# Patient Record
Sex: FEMALE | Race: WHITE | Employment: STUDENT | URBAN - METROPOLITAN AREA
[De-identification: names, ages, dates, MRNs, and addresses within clinical notes are randomized per-mention and may not be internally consistent; named-entity substitution may affect disease eponyms.]

---
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
## 2020-01-24 ENCOUNTER — OFFICE VISIT (OUTPATIENT)
Dept: FAMILY MEDICINE CLINIC | Age: 20
End: 2020-01-24
Payer: COMMERCIAL

## 2020-01-24 VITALS
HEIGHT: 61 IN | WEIGHT: 160.2 LBS | DIASTOLIC BLOOD PRESSURE: 80 MMHG | SYSTOLIC BLOOD PRESSURE: 118 MMHG | HEART RATE: 88 BPM | OXYGEN SATURATION: 98 % | BODY MASS INDEX: 30.25 KG/M2

## 2020-01-24 PROCEDURE — 99213 OFFICE O/P EST LOW 20 MIN: CPT | Performed by: PHYSICIAN ASSISTANT

## 2020-01-24 RX ORDER — SUMATRIPTAN 50 MG/1
TABLET, FILM COATED ORAL
Qty: 30 TABLET | Refills: 1 | Status: SHIPPED | OUTPATIENT
Start: 2020-01-24 | End: 2021-08-13

## 2020-01-24 RX ORDER — TOPIRAMATE 25 MG/1
TABLET ORAL
Qty: 90 TABLET | Refills: 2 | Status: SHIPPED | OUTPATIENT
Start: 2020-01-24 | End: 2020-05-11 | Stop reason: SDUPTHER

## 2020-01-24 RX ORDER — SUMATRIPTAN 25 MG/1
25 TABLET, FILM COATED ORAL
Qty: 9 TABLET | Refills: 1 | Status: CANCELLED | OUTPATIENT
Start: 2020-01-24 | End: 2020-01-24

## 2020-01-24 ASSESSMENT — PATIENT HEALTH QUESTIONNAIRE - PHQ9
SUM OF ALL RESPONSES TO PHQ QUESTIONS 1-9: 0
SUM OF ALL RESPONSES TO PHQ9 QUESTIONS 1 & 2: 0
2. FEELING DOWN, DEPRESSED OR HOPELESS: 0
SUM OF ALL RESPONSES TO PHQ QUESTIONS 1-9: 0
1. LITTLE INTEREST OR PLEASURE IN DOING THINGS: 0

## 2020-01-24 NOTE — PROGRESS NOTES
medications for this visit. ALLERGIES  No Known Allergies    PHYSICAL EXAM    /80   Pulse 88   Ht 5' 1\" (1.549 m)   Wt 160 lb 3.2 oz (72.7 kg)   SpO2 98%   BMI 30.27 kg/m²     Constitutional:  Well developed, well nourished  HENT:  Normocephalic, atraumatic  Eyes:  conjunctiva normal, no discharge, no scleral icterus  Neck:  No tenderness, supple, no thyromegaly  Lymphatic:  No lymphadenopathy noted  Cardiovascular:  Normal heart rate, normal rhythm, no murmurs, gallops or rubs  Thorax & Lungs:  Normal breath sounds, no respiratory distress, no wheezing  Skin:  Warm, dry, no erythema, no rash  Neurologic:  Alert & oriented X 3, normal motor function, normal sensory function, no focal deficits noted  Psychiatric:  Affect normal, mood normal    ASSESSMENT & PLAN    Vandana Zechariah was seen today for other. Diagnoses and all orders for this visit:    Other migraine without status migrainosus, not intractable  -     topiramate (TOPAMAX) 25 MG tablet; Take one tablet po every morning, take two tablets po every evening  -     SUMAtriptan (IMITREX) 50 MG tablet; Take 1-2 tablets po at onset of migraine. May repeat two hours later if needed. Max dose:  200 mg in 24 hours. Migraines have improved in frequency, but there is still room for improvement. Patient is currently taking Topamax 50 mg every evening. We will increase Topamax. Patient to take 25 mg every morning and 50 mg every evening. We will also increase her Imitrex as that is not working either. Imitrex 50 mg tablets. Patient will take 1 to 2 tablets at onset of migraine. She may repeat this dose 2 hours later if headache is still present. She understands that her max dose in a 24-hour period is 200 mg. She was encouraged to keep a migraine log. We will see her back in 3 months, but sooner if needed.     Medications Discontinued During This Encounter   Medication Reason    SUMAtriptan (IMITREX) 25 MG tablet DOSE ADJUSTMENT    topiramate

## 2020-05-11 ENCOUNTER — VIRTUAL VISIT (OUTPATIENT)
Dept: FAMILY MEDICINE CLINIC | Age: 20
End: 2020-05-11
Payer: COMMERCIAL

## 2020-05-11 VITALS — BODY MASS INDEX: 28.32 KG/M2 | WEIGHT: 150 LBS | HEIGHT: 61 IN

## 2020-05-11 PROCEDURE — 99213 OFFICE O/P EST LOW 20 MIN: CPT | Performed by: FAMILY MEDICINE

## 2020-05-11 RX ORDER — TOPIRAMATE 25 MG/1
TABLET ORAL
Qty: 270 TABLET | Refills: 1 | Status: SHIPPED | OUTPATIENT
Start: 2020-05-11 | End: 2021-08-13

## 2020-05-11 ASSESSMENT — ENCOUNTER SYMPTOMS
DIARRHEA: 0
NAUSEA: 0
CHEST TIGHTNESS: 0
VOMITING: 0
TROUBLE SWALLOWING: 0
BLOOD IN STOOL: 0
WHEEZING: 0
ABDOMINAL PAIN: 0
EYE PAIN: 0
SHORTNESS OF BREATH: 0

## 2020-05-11 NOTE — PROGRESS NOTES
2020    TELEHEALTH EVALUATION -- Audio/Visual (During ECNGT-99 public health emergency)    HPI:    Grecia Bustos (:  2000) has requested an audio/video evaluation for the following concern(s):    Follow-up on migraine headaches. Patient states the headaches are actually doing fairly well occasionally wakes up with a headache that goes away rather quickly saw her dentist feels that she does have a TMJ and was given some bite plate that seem to have helped. Migraines otherwise are under better control with less severe with the Topamax and the Imitrex has been helpful. 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 Review of Systems   Constitutional: Negative for activity change and fatigue. HENT: Negative for congestion, hearing loss, mouth sores and trouble swallowing. Eyes: Negative for pain and visual disturbance. Respiratory: Negative for chest tightness, shortness of breath and wheezing. Cardiovascular: Negative for chest pain and palpitations. Gastrointestinal: Negative for abdominal pain, blood in stool, diarrhea, nausea and vomiting. Genitourinary: Negative for dysuria, frequency and urgency. Musculoskeletal: Negative for arthralgias, gait problem and neck stiffness. Skin: Negative for rash. Allergic/Immunologic: Negative for environmental allergies. Neurological: Positive for headaches. Negative for dizziness, seizures, speech difficulty, weakness and light-headedness. Hematological: Does not bruise/bleed easily. Psychiatric/Behavioral: Negative for agitation, confusion and hallucinations. Prior to Visit Medications    Medication Sig Taking? Authorizing Provider   topiramate (TOPAMAX) 25 MG tablet Take one tablet po every morning, take two tablets po every evening Yes Emanuel Guillen MD   SUMAtriptan (IMITREX) 50 MG tablet Take 1-2 tablets po at onset of migraine. May repeat two hours later if needed.   Max dose:  200 mg in 24 hours. Yes Debbie Oviedo PA-C   triamcinolone (KENALOG) 0.1 % cream Apply topically 2 times daily. Yes JEFERSON Paulson   ibuprofen (ADVIL;MOTRIN) 200 MG tablet Take 200 mg by mouth every 6 hours as needed for Pain. Historical Provider, MD       Social History     Tobacco Use    Smoking status: Never Smoker    Smokeless tobacco: Never Used   Substance Use Topics    Alcohol use: No    Drug use: No            PHYSICAL EXAMINATION:  [ INSTRUCTIONS:  \"[x]\" Indicates a positive item  \"[]\" Indicates a negative item  -- DELETE ALL ITEMS NOT EXAMINED]  Vital Signs: (As obtained by patient/caregiver or practitioner observation)    Blood pressure-  Heart rate-    Respiratory rate-    Temperature-  Pulse oximetry-     Constitutional: [x] Appears well-developed and well-nourished [x] No apparent distress      [] Abnormal-   Mental status  [x] Alert and awake  [x] Oriented to person/place/time [x]Able to follow commands      Eyes:  EOM    []  Normal  [] Abnormal-  Sclera  [x]  Normal  [] Abnormal -         Discharge []  None visible  [] Abnormal -    HENT:   [x] Normocephalic, atraumatic.   [] Abnormal   [x] Mouth/Throat: Mucous membranes are moist.     External Ears [] Normal  [] Abnormal-     Neck: [x] No visualized mass     Pulmonary/Chest: [x] Respiratory effort normal.  [x] No visualized signs of difficulty breathing or respiratory distress        [] Abnormal-      Musculoskeletal:   [] Normal gait with no signs of ataxia         [x] Normal range of motion of neck        [] Abnormal-       Neurological:        [x] No Facial Asymmetry (Cranial nerve 7 motor function) (limited exam to video visit)          [x] No gaze palsy        [] Abnormal-         Skin:        [x] No significant exanthematous lesions or discoloration noted on facial skin         [] Abnormal-            Psychiatric:       [x] Normal Affect [] No Hallucinations        [] Abnormal-     Other pertinent observable physical exam findings- ASSESSMENT/PLAN:  1. Other migraine without status migrainosus, not intractable  At this point- refills provided. Patient encouraged for continued healthy lifestyle with rest witgh plenty of fluids. She was reminded that she should not take Topamax if there is any chance that she is pregnant. - topiramate (TOPAMAX) 25 MG tablet; Take one tablet po every morning, take two tablets po every evening  Dispense: 270 tablet; Refill: 1      Return in about 4 months (around 9/11/2020). Lefty Hernandez is a 21 y.o. female being evaluated by a Virtual Visit (video visit) encounter to address concerns as mentioned above. A caregiver was present when appropriate. Due to this being a TeleHealth encounter (During [place] [address] public health emergency), evaluation of the following organ systems was limited: Vitals/Constitutional/EENT/Resp/CV/GI//MS/Neuro/Skin/Heme-Lymph-Imm. Pursuant to the emergency declaration under the [address] [place] [address] authority and the [employer] Act, this Virtual Visit was conducted with patient's (and/or legal guardian's) consent, to reduce the patient's risk of exposure to COVID-19 and provide necessary medical care. The patient (and/or legal guardian) has also been advised to contact this office for worsening conditions or problems, and seek emergency medical treatment and/or call 911 if deemed necessary. Patient identification was verified at the start of the visit: Yes    Total time spent on this encounter: 15 minutes    Services see her back as directed through a video synchronous discussion virtually to substitute for in-person clinic visit. Patient and provider were located at their individual homes. --Ambrocio Yancey MD on 5/11/2020 at 5:07 PM    An electronic signature was used to authenticate this note.

## 2020-12-16 ENCOUNTER — OFFICE VISIT (OUTPATIENT)
Dept: FAMILY MEDICINE CLINIC | Age: 20
End: 2020-12-16
Payer: COMMERCIAL

## 2020-12-16 VITALS
HEART RATE: 120 BPM | SYSTOLIC BLOOD PRESSURE: 120 MMHG | TEMPERATURE: 96.9 F | OXYGEN SATURATION: 100 % | HEIGHT: 61 IN | BODY MASS INDEX: 28.36 KG/M2 | DIASTOLIC BLOOD PRESSURE: 78 MMHG | WEIGHT: 150.2 LBS

## 2020-12-16 DIAGNOSIS — R00.0 TACHYCARDIA: ICD-10-CM

## 2020-12-16 DIAGNOSIS — R00.2 PALPITATIONS: ICD-10-CM

## 2020-12-16 DIAGNOSIS — Z11.4 SCREENING FOR HUMAN IMMUNODEFICIENCY VIRUS: ICD-10-CM

## 2020-12-16 LAB
A/G RATIO: 1.9 (ref 1.1–2.2)
ALBUMIN SERPL-MCNC: 5 G/DL (ref 3.4–5)
ALP BLD-CCNC: 46 U/L (ref 40–129)
ALT SERPL-CCNC: 16 U/L (ref 10–40)
ANION GAP SERPL CALCULATED.3IONS-SCNC: 15 MMOL/L (ref 3–16)
AST SERPL-CCNC: 18 U/L (ref 15–37)
BASOPHILS ABSOLUTE: 0 K/UL (ref 0–0.2)
BASOPHILS RELATIVE PERCENT: 0.7 %
BILIRUB SERPL-MCNC: 0.9 MG/DL (ref 0–1)
BUN BLDV-MCNC: 14 MG/DL (ref 7–20)
CALCIUM SERPL-MCNC: 9.8 MG/DL (ref 8.3–10.6)
CHLORIDE BLD-SCNC: 106 MMOL/L (ref 99–110)
CO2: 18 MMOL/L (ref 21–32)
CREAT SERPL-MCNC: 0.8 MG/DL (ref 0.6–1.1)
EOSINOPHILS ABSOLUTE: 0.1 K/UL (ref 0–0.6)
EOSINOPHILS RELATIVE PERCENT: 2 %
GFR AFRICAN AMERICAN: >60
GFR NON-AFRICAN AMERICAN: >60
GLOBULIN: 2.6 G/DL
GLUCOSE BLD-MCNC: 96 MG/DL (ref 70–99)
HCT VFR BLD CALC: 40.7 % (ref 36–48)
HEMOGLOBIN: 13.5 G/DL (ref 12–16)
LYMPHOCYTES ABSOLUTE: 1.6 K/UL (ref 1–5.1)
LYMPHOCYTES RELATIVE PERCENT: 29.5 %
MCH RBC QN AUTO: 28.7 PG (ref 26–34)
MCHC RBC AUTO-ENTMCNC: 33.2 G/DL (ref 31–36)
MCV RBC AUTO: 86.5 FL (ref 80–100)
MONOCYTES ABSOLUTE: 0.4 K/UL (ref 0–1.3)
MONOCYTES RELATIVE PERCENT: 7.1 %
NEUTROPHILS ABSOLUTE: 3.3 K/UL (ref 1.7–7.7)
NEUTROPHILS RELATIVE PERCENT: 60.7 %
PDW BLD-RTO: 12.8 % (ref 12.4–15.4)
PLATELET # BLD: 312 K/UL (ref 135–450)
PMV BLD AUTO: 9.5 FL (ref 5–10.5)
POTASSIUM SERPL-SCNC: 4.2 MMOL/L (ref 3.5–5.1)
RBC # BLD: 4.71 M/UL (ref 4–5.2)
SODIUM BLD-SCNC: 139 MMOL/L (ref 136–145)
T4 FREE: 1.3 NG/DL (ref 0.9–1.8)
TOTAL PROTEIN: 7.6 G/DL (ref 6.4–8.2)
TSH SERPL DL<=0.05 MIU/L-ACNC: 1.51 UIU/ML (ref 0.27–4.2)
WBC # BLD: 5.5 K/UL (ref 4–11)

## 2020-12-16 PROCEDURE — 99214 OFFICE O/P EST MOD 30 MIN: CPT | Performed by: PHYSICIAN ASSISTANT

## 2020-12-16 ASSESSMENT — ENCOUNTER SYMPTOMS
COUGH: 0
SHORTNESS OF BREATH: 0

## 2020-12-16 NOTE — PROGRESS NOTES
12/16/2020    [address]    Chief Complaint   Patient presents with    Palpitations     pt states heart rate increases up to 138 , has had exposure to covid but tested neg    Anxiety     pt states she has palp when anxious     Migraine     2-3 past 2 wks, pt states not always compliant with topamax but has been recently        HPI  History obtained from the patient. Ilia Melton is a 21 y.o. female who presents today for palpitations and fast heart rate X few weeks. The patient states that she obtained a pulse ox monitor and has been checking her pulse randomly at home. She states that it runs 135-160. If she sits down or lays down for a while, she can get it down to 100. Denies chest pain, SOB, lightheadness, cough. She notes that she does frequently feel anxious, but denies symptoms such as panic attacks, sweating, etc.     The patient states that she has trouble remembering to take her Topamax. She states that when she does remember to take her medicine regularly, she only gets migraines once a month, but if she's off of it for a while, she gets migraines once or twice weekly. Notes that she rarely takes imitrex because she does not find it helpful. REVIEW OF SYMPTOMS  Review of Systems   Constitutional: Negative for chills and fever. Respiratory: Negative for cough and shortness of breath. Cardiovascular: Positive for palpitations. Negative for chest pain. PAST MEDICAL HISTORY  No past medical history on file.     FAMILY HISTORY  Family History   Problem Relation Age of Onset    Cancer Other         Grandmother/Grandfather    Asthma Paternal Grandfather     Migraines Mother     Kidney Disease Other         Grandfather    Hypertension Other     Asthma Father        SOCIAL HISTORY  Social History     Socioeconomic History    Marital status: Single     Spouse name: Not on file    Number of children: Not on file    Years of education: Not on file    Highest education level: Not on file Occupational History    Not on file   Social Needs    Financial resource strain: Not on file    Food insecurity     Worry: Not on file     Inability: Not on file    Transportation needs     Medical: Not on file     Non-medical: Not on file   Tobacco Use    Smoking status: Never Smoker    Smokeless tobacco: Never Used   Substance and Sexual Activity    Alcohol use: No    Drug use: No    Sexual activity: Never   Lifestyle    Physical activity     Days per week: Not on file     Minutes per session: Not on file    Stress: Not on file   Relationships    Social connections     Talks on phone: Not on file     Gets together: Not on file     Attends Christian service: Not on file     Active member of club or organization: Not on file     Attends meetings of clubs or organizations: Not on file     Relationship status: Not on file    Intimate partner violence     Fear of current or ex partner: Not on file     Emotionally abused: Not on file     Physically abused: Not on file     Forced sexual activity: Not on file   Other Topics Concern    Not on file   Social History Narrative    Not on file        SURGICAL HISTORY  Past Surgical History:   Procedure Laterality Date    TONSILLECTOMY         CURRENT MEDICATIONS  Current Outpatient Medications   Medication Sig Dispense Refill    topiramate (TOPAMAX) 25 MG tablet Take one tablet po every morning, take two tablets po every evening 270 tablet 1    SUMAtriptan (IMITREX) 50 MG tablet Take 1-2 tablets po at onset of migraine. May repeat two hours later if needed. Max dose:  200 mg in 24 hours. 30 tablet 1    triamcinolone (KENALOG) 0.1 % cream Apply topically 2 times daily. (Patient taking differently: Apply topically 2 times daily. prn) 30 g 1    ibuprofen (ADVIL;MOTRIN) 200 MG tablet Take 200 mg by mouth every 6 hours as needed for Pain. No current facility-administered medications for this visit.         ALLERGIES  No Known Allergies    RECENT LABS No results found for: LABA1C  No results found for: EAG    No results found for: CHOL  No results found for: LDLCHOLESTEROL, LDLCALC    No results found for: WBC, HGB, HCT, MCV, PLT    PHYSICAL EXAM  /78   Pulse 120   Temp 96.9 °F (36.1 °C)   Ht 5' 1\" (1.549 m)   Wt 150 lb 3.2 oz (68.1 kg)   SpO2 100%   BMI 28.38 kg/m²     Physical Exam  Constitutional:       Appearance: Normal appearance. HENT:      Head: Normocephalic and atraumatic. Eyes:      Comments: EOM grossly intact. Cardiovascular:      Rate and Rhythm: Regular rhythm. Tachycardia present. Heart sounds: No murmur. No friction rub. No gallop. Pulmonary:      Effort: Pulmonary effort is normal.      Breath sounds: Normal breath sounds. No wheezing, rhonchi or rales. Skin:     General: Skin is warm and dry. Neurological:      Mental Status: She is alert and oriented to person, place, and time. Comments: Cranial nerves II-XII grossly intact   Psychiatric:         Mood and Affect: Mood normal.         Behavior: Behavior normal.         ASSESSMENT & PLAN  1. Palpitations  Will check labs and Holter Monitor and advise based on findings. If these come back normal, we may consider switching from Topamax to Propranolol for migraine prophylaxis, as this may help with migraine prevention AND tachycardia. - CBC Auto Differential; Future  - Comprehensive Metabolic Panel; Future  - T4, Free; Future  - TSH without Reflex; Future  - Holter Monitor 48 Hour; Future    2. Tachycardia  See above (#1).   - CBC Auto Differential; Future  - Comprehensive Metabolic Panel; Future  - T4, Free; Future  - TSH without Reflex; Future  - Holter Monitor 48 Hour; Future    3. Migraine without status migrainosus, not intractable, unspecified migraine type  See above (#1). 4. Screening for human immunodeficiency virus  [place] maintenance requirement.   - HIV Screen; Future          Return in about 3 weeks (around 1/6/2021).

## 2020-12-16 NOTE — PATIENT INSTRUCTIONS
Patient Education        Palpitations: Care Instructions  Your Care Instructions     Heart palpitations are the uncomfortable sensation that your heart is beating fast or irregularly. You might feel pounding or fluttering in your chest. It might feel like your heart is skipping a beat. Although palpitations may be caused by a heart problem, they also occur because of stress, fatigue, or use of alcohol, caffeine, or nicotine. Many medicines, including diet pills, antihistamines, decongestants, and some herbal products, can cause heart palpitations. Nearly everyone has palpitations from time to time. Depending on your symptoms, your doctor may need to do more tests to try to find the cause of your palpitations. Follow-up care is a key part of your treatment and safety. Be sure to make and go to all appointments, and call your doctor if you are having problems. It's also a good idea to know your test results and keep a list of the medicines you take. How can you care for yourself at home? · Avoid caffeine, nicotine, and excess alcohol. · Do not take illegal drugs, such as methamphetamines and cocaine. · Do not take weight loss or diet medicines unless you talk with your doctor first.  · Get plenty of sleep. · Do not overeat. · If you have palpitations again, take deep breaths and try to relax. This may slow a racing heart. · If you start to feel lightheaded, lie down to avoid injuries that might result if you pass out and fall down. · Keep a record of your palpitations and bring it to your next doctor's appointment. Write down:  ? The date and time. ? Your pulse. (If your heart is beating fast, it may be hard to count your pulse.)  ? What you were doing when the palpitations started. ? How long the palpitations lasted. ? Any other symptoms. · If an activity causes palpitations, slow down or stop. Talk to your doctor before you do that activity again. Care instructions adapted under license by [facility] ([facility]). If you have questions about a medical condition or this instruction, always ask your healthcare professional. [address] any warranty or liability for your use of this information. Patient Education         Relaxation Exercise: Deep Breathing (01:49)  Your health professional recommends that you watch this short online health video. Learn a deep-breathing exercise to reduce your stress. How to watch the video    Scan the QR code   OR Visit the website    [email or web address]://[email or web address]   Current as of: December 16, 2019               Content Version: 12.6  © 0058-0991 [employer]. Care instructions adapted under license by [employer] [facility] ([facility]). If you have questions about a medical condition or this instruction, always ask your healthcare professional. [address] any warranty or liability for your use of this information.

## 2020-12-17 LAB
HIV AG/AB: NORMAL
HIV ANTIGEN: NORMAL
HIV-1 ANTIBODY: NORMAL
HIV-2 AB: NORMAL

## 2020-12-22 ENCOUNTER — HOSPITAL ENCOUNTER (OUTPATIENT)
Dept: NON INVASIVE DIAGNOSTICS | Age: 20
Discharge: HOME | End: 2020-12-22
Payer: COMMERCIAL

## 2020-12-22 PROCEDURE — 93226 XTRNL ECG REC<48 HR SCAN A/R: CPT

## 2020-12-22 PROCEDURE — 93225 XTRNL ECG REC<48 HRS REC: CPT

## 2020-12-30 NOTE — PROCEDURES
[address], [address] [place] [address]                                 HOLTER MONITOR    PATIENT NAME: Vern Holman                    :        2000  MED REC NO:   [id number]                          ROOM:  ACCOUNT NO:   [de-identified]                           ADMIT DATE: 2020  PROVIDER:     Linh Mcginnis MD    HOLTER MONITOR 24-HOURS    DATE OF STUDY:  2020    INDICATION:  Tachycardia. FINDINGS:  This is a 24-hour Holter monitor. Minimum heart rate is 51  beats per minute at 8:34 a.m. Average heart rate is 91 beats per  minute. Maximum heart rate is 180 beats per minute. The longest R-R  interval is 1.34 seconds. The patient's entire rhythm is sinus rhythm. The patient had an episode of tachycardia. SVT cannot be excluded. The  rate of 180 present. This was at 9:05 a.m. and the patient woke up at  that time. The patient had isolated PVC also present. The patient had  another episode of tachycardia, rate of 172 beats per minute present. This was on 2020 at 8:52 a.m. The patient did not report symptoms  with that. She has some PVCs also present. Isolated one PVC was noted. IMPRESSION:  1. Sinus rhythm. 2.  Isolated PVC. 3.  Sinus tachycardia. 4.  Short runs of SVT present. The maximum rate of 180 beats per minute  present. Clinical correlation is recommended.         Nohelia Welch MD    D: 2020 18:30:51       T: 2020 22:27:37     [id number]  Job#: [id number]     Doc#: [id number]

## 2020-12-31 NOTE — RESULT ENCOUNTER NOTE
Please call the patient and let them know that I reviewed her Holter monitor results. It shows mostly normal sinus rhythm, but there were two episodes of tachycardia (fast heart rate). I'd like to have a cardiologist take a look at these findings and evaluate her, so I've placed a referral to [facility]. She should receive a call to schedule an appointment within the next week or so. Please make sure she has their info:    [facility]   [address] [facility], [address] [facility],Third [address]  [phone]    Thanks,  Carri Turcios

## 2021-01-05 ENCOUNTER — TELEPHONE (OUTPATIENT)
Dept: CARDIOLOGY CLINIC | Age: 21
End: 2021-01-05

## 2021-01-05 NOTE — TELEPHONE ENCOUNTER
Tried calling patient to schedule a consult for palpitations and abnormal holter. Referral from  [facility]. There was no answer, left message asking patient to return call to office to schedule the appointment.

## 2021-01-11 ENCOUNTER — TELEPHONE (OUTPATIENT)
Dept: CARDIOLOGY CLINIC | Age: 21
End: 2021-01-11

## 2021-08-13 ENCOUNTER — INITIAL CONSULT (OUTPATIENT)
Dept: CARDIOLOGY CLINIC | Age: 21
End: 2021-08-13
Payer: COMMERCIAL

## 2021-08-13 VITALS
HEART RATE: 65 BPM | DIASTOLIC BLOOD PRESSURE: 70 MMHG | OXYGEN SATURATION: 98 % | SYSTOLIC BLOOD PRESSURE: 100 MMHG | RESPIRATION RATE: 14 BRPM | HEIGHT: 61 IN | BODY MASS INDEX: 30.02 KG/M2 | WEIGHT: 159 LBS

## 2021-08-13 DIAGNOSIS — R00.2 PALPITATIONS: Primary | ICD-10-CM

## 2021-08-13 PROCEDURE — 99243 OFF/OP CNSLTJ NEW/EST LOW 30: CPT | Performed by: INTERNAL MEDICINE

## 2021-08-13 PROCEDURE — 93000 ELECTROCARDIOGRAM COMPLETE: CPT | Performed by: INTERNAL MEDICINE

## 2021-08-13 RX ORDER — PROPRANOLOL HYDROCHLORIDE 80 MG/1
80 CAPSULE, EXTENDED RELEASE ORAL DAILY
COMMUNITY
Start: 2021-08-07 | End: 2021-08-13 | Stop reason: ALTCHOICE

## 2021-08-13 RX ORDER — PROPRANOLOL HYDROCHLORIDE 20 MG/1
20 TABLET ORAL 3 TIMES DAILY
Qty: 90 TABLET | Refills: 0 | Status: SHIPPED | OUTPATIENT
Start: 2021-08-13 | End: 2021-09-30

## 2021-08-13 RX ORDER — IVABRADINE 5 MG/1
5 TABLET, FILM COATED ORAL 2 TIMES DAILY WITH MEALS
COMMUNITY
Start: 2021-08-12

## 2021-08-13 NOTE — PROGRESS NOTES
Electrophysiology Consult Note      Reason for consultation:  Tachycardia    Chief complaint : Shortness of breath with exertion    Referring physician:       Primary care physician: Theresa Adames MD      History of Present Illness:     Patient is a 44-year-old female with no significant medical history referred by Dr. Sarah Otero for tachycardia. Patient reports that she used to weigh a lot less before and used to be very athletic now she is having shortness of breath with exertion and also having heart rates very high into 170s. Patient also reports her chest hurts with exertion. Patient is on medications and still heart rate is high. Patient denies any palpitations dizziness or edema. Patient drinks decaf coffee. Patient reports she has quit exercising for 4 years and started exercising again and she noticed her heart rates were high. Patient reported her heart rate is going up to 170s without any exertion. Patient was started on ivabradine and metoprolol and she started having symptoms of shortness of breath with exertion. Patient reported that her heart rate was 195 beats other day without much exertion and her mother is a nurse and she wanted evaluation    Pastmedical history: No significant medical history    Surgical history :   Past Surgical History:   Procedure Laterality Date    TONSILLECTOMY         Family history:   Family History   Problem Relation Age of Onset    Cancer Other         Grandmother/Grandfather    Asthma Paternal Grandfather     Migraines Mother     Kidney Disease Other         Grandfather    Hypertension Other     Asthma Father        Social history :  reports that she has never smoked. She has never used smokeless tobacco. She reports that she does not drink alcohol and does not use drugs.     No Known Allergies    Current Outpatient Medications on File Prior to Visit   Medication Sig Dispense Refill    CORLANOR 5 Abdomen is flat. Palpations: Abdomen is soft. Musculoskeletal:         General: No tenderness. Normal range of motion. Cervical back: Normal range of motion. Right lower leg: No edema. Left lower leg: No edema. Skin:     General: Skin is warm and dry. Neurological:      General: No focal deficit present. Mental Status: She is alert and oriented to person, place, and time. CBC:   Lab Results   Component Value Date    WBC 5.5 12/16/2020    HGB 13.5 12/16/2020    HCT 40.7 12/16/2020     12/16/2020     Lipids:No results found for: CHOL, TRIG, HDL, LDLCALC, LDLDIRECT  PT/INR: No results found for: INR     BMP:    Lab Results   Component Value Date     12/16/2020    K 4.2 12/16/2020     12/16/2020    CO2 18 (L) 12/16/2020    BUN 14 12/16/2020     CMP:   Lab Results   Component Value Date    AST 18 12/16/2020    PROT 7.6 12/16/2020    BILITOT 0.9 12/16/2020    ALKPHOS 46 12/16/2020     TSH:    Lab Results   Component Value Date    TSH 1.51 12/16/2020       EKGINTERPRETATION - EKG Interpretation:  Sinus rhythm      IMPRESSION / RECOMMENDATIONS:     Shortness of breath with exertion  Obesity BMI 30      Patient is a 75-year-old female with not much medical history reports that she was noted her heart rate was 160s incidentally and later she started noticing that she is having tachycardia episodes and palpitations so she was thought to have inappropriate sinus tachycardia and patient was started on ivabradine and metoprolol. Patient still continued to have heart rates into 170s and 190s and then recently she was changed from metoprolol to propranolol. Question of concern is If she is having SVT versus inappropriate sinus tachycardia. Patient was an athlete in school but she has decreased activity for 4 years and then started exercising and she started having these symptoms.   Patient with increasing activity so if trying to condition from deconditioning state

## 2021-08-13 NOTE — PATIENT INSTRUCTIONS
EP Study scheduled with Hodan Olguin on 10/7/21 at 2:30. Arrive at 11:30 at [facility]. Sign consent form on 10/6 at 330.  Pre-testing and COVID test to be done STAT on arrival.

## 2021-08-13 NOTE — LETTER
[facility]     Dr. Sae Espinosa     PROCEDURE: Electrophysiology Study/Supraventricular Tachycardia Ablation      Date of Procedure: 10/7/21  Time: 9728   Arrival Time: 930    Patient Name: Benji Dallas  : 2000  MRN# [id number]    HOSPITAL: [facility])    Call to Pre-[place] at [phone]  2 days before your procedure    x Please have blood work and chest-x-ray done Stat on arrival  at [facility], [address] [place] [address] or [facility].    X Please have COVID-19 Test done on STAT on arrival  at the [place] [address]. You will need to Quarantine yourself until procedure. x Please do not have anything by mouth after midnight prior to or 8 hours  before the procedure.    x You may take your medications with a sip of water in the morning before your procedure or take them with you unless listed below. IMPORTANT NOTICE TO PATIENTS: Prior Authorization  We will contact your insurance for prior authorization for the CPT code related to the procedure it is the patient's responsibility to contact their insurance to ensure they are following their medical plans provisions or requirements! Patient Signature:  _______________________      Staff: Oseas Emanuel MA     Staff Given Instructions:___________________________                    [place] [facility]     Dr. Caridad Carlton:  Electrophysiology Study/Supraventricular Tachycardia Ablation     Date of Procedure: 10/7/21 Time: 8054 Arrival Time: 930    Patient Name: Benji Dallas  : 2000  MRN# [id number]    Day of Procedure Cath Lab Holding area Pre op Orders:      ? IV peripheral saline lock x 2 sites (at least one inpreferred left arm). ? Type & Screen STAT on arrival.  ? If taking Coumadin, PT INR STAT on arrival day of procedure. ?  Female patients <=48years of age >> Please do urine HCG test.  ? Diabetic patients >> Accu check Blood sugar check. ? Document home medications in EPIC and include date and time of last dose.  ? NPO  ? Notify Dr. Chon Knox of abnormal lab results. ? Chest Prep> Clip hair anterior chest and posterior back. ? Groin Prep> Clip hair bilateral groins. Physician Signature:_____________________Date:__________Time:________                                                       *This consent is applicable for 30 days following patient signature*    [address] / PROCEDURE    I (We)Ladan authorize, Dr. Rekha Avendaño    and such assistants as may be selected by him/her, to perform the following operation/procedures:  Electrophysiology Study/Supraventricular Tachycardia Ablation    Note: If unable to obtain consent prior to an emergent procedure, document the emergent reason in the medical record. This procedure has been explained to my (our) satisfaction and included in the explanation was:   A) the intended benefit, nature, and extent of the procedure to be performed;   B) the significant risks involved and the probability of success;   C) alternative procedures and methods of treatment;   D) the dangers and probable consequences of such alternatives (including no procedure or treatment); E) the expected consequences of the procedure on my future health;   F) whether other qualified individuals would be performing important surgical tasks and / or whether  would be present to advise or support the procedure. I (we) understand that there are other risks of infection and other serious complications in the pre-operative/procedural and postoperative/procedural stages of my (our) care. I (we) have asked all of the questions which I (we) thought were important in deciding whether or not to undergo treatment or diagnosis. These questions have been answered to my (our) satisfaction.    I (we) understand that no assurance can be given that the procedure will be a success, and no guarantee or warranty of success has been given to me (us). 2. It has been explained to me (us) that during the course of the operation/procedure, unforeseen conditions may be revealed that necessitate extension of the original procedure(s) or different procedure(s) than those set forth in Paragraph 1. I (we) authorize and request that the above-named physician, his/her assistants or his/her designees, perform procedures as necessary and desirable if deemed to be in my (our) best interest.     3. I acknowledge that other health care personnel may be observing this procedure for the purpose of medical education or other specified purposes as may be necessary as requested and/or approved by my (our) physician. 4. I (we) consent to the disposal by the hospital Pathologist of the removed tissue, parts or organs in accordance with hospital policy. 5. I do_____ do not______ consent to the use of a local infiltration pain blocking agent that will be used by my provider/surgical provider to help alleviate pain during my procedure. 6. I do_____ do not_____ consent to an emergent blood transfusion in the case of a life-threatening situation that requires blood components to be administered. This consent is valid for 24 hours from the beginning of the procedure. 7. This patient does _____ or does not ______currently have a DNR status/order. If DNR order is in place, obtain \"Addendum to the Surgical Consent for ALL Patients with a DNR Order\" to address joshua-operative status for limited intervention or DNR suspension.      8. I have read and fully understand the above Consent for Operation/Procedure and that all blanks were completed before I signed the consent.     _______________________________________   _____/____am/pm   Signature of Patient or legal representative Printed Name / Relationship Date / Time   _______________________________________   _____/____ am/pm   Witness to Signature Printed Name Date / Time     Informed consent:   I have provided the explanation described above in section 1 to the patient and/or legal representative. I have provided the patient and/or legal representative with an opportunity to ask any questions about the proposed operation/procedure.   _____________________________________   ____/____ am/pm   Provider / Proceduralist Printed Name Date / Time     Revised 2021                           [facility]     Dr. Mariana Webster     PATIENT NAME:    Lynne Johnson                          :2000    PROCEDURE:  Electrophysiology Study/Supraventricular Tachycardia Ablation       DATE OF PROCEDURE: 10/7/21    DIAGNOSIS:   I47.1 , Z01.810, Z79.0             X MAG    X PHOS       X BMP           X CBC     X    PT      X   PTT                X     Chest x-Ray PA & Lateral View          ? PLEASE CALL ABNORMAL RESULTS TO THE REQUESTING PHYSICIAN? ATTENTION PATIENTS:  You do not have to fast for the lab work.           You must go to the [facility], [address] [place] [address] or [facility]         Physician Signature:____________________Date:_________Time:_________                                    [facility]     Dr. Juan Martinez TO SCHEDULE:    PROCEDURE: Electrophysiology Study/Supraventricular Tachycardia Ablation    Patient Name: Lynne Johnson  : 2000  MRN# [id number]    Home Phone Number: [phone]   Weight:    Wt Readings from Last 3 Encounters:   21 159 lb (72.1 kg)   20 150 lb 3.2 oz (68.1 kg)   20 150 lb (68 kg)        Insurance: Payor: Maikol Paredes / Plan: Maikol Gutierrez [place] PPO / Product Type: *No Product type* /     Date of Procedure: 10/7/21 Time: 1130 Arrival Time: 0930    Diagnosis:  I47.1 (Supraventricular Tachycardia) Allergies: No Known Allergies     1) Call [address] [place] [address] ([phone]) or [facility],[address]     PHONE OR   INSTANT MESSAGE  2) PREAUTHORIZATION NUMBER:    Spoke to:      From date:     expiration date:        Hima Bautista, [address] [facility] Nanette French

## 2021-09-01 ASSESSMENT — ENCOUNTER SYMPTOMS
ABDOMINAL PAIN: 0
CONSTIPATION: 0
EYE PAIN: 0
WHEEZING: 0
CHEST TIGHTNESS: 0
BLOOD IN STOOL: 0
DIARRHEA: 0
BACK PAIN: 0
VOMITING: 0
SHORTNESS OF BREATH: 1
NAUSEA: 0
COLOR CHANGE: 0
PHOTOPHOBIA: 0
COUGH: 0

## 2021-09-30 RX ORDER — PROPRANOLOL HYDROCHLORIDE 20 MG/1
TABLET ORAL
Qty: 90 TABLET | Refills: 2 | Status: SHIPPED | OUTPATIENT
Start: 2021-09-30 | End: 2022-05-18

## 2021-10-06 ENCOUNTER — NURSE ONLY (OUTPATIENT)
Dept: CARDIOLOGY CLINIC | Age: 21
End: 2021-10-06

## 2021-10-06 DIAGNOSIS — R00.0 TACHYCARDIA: ICD-10-CM

## 2021-10-06 PROCEDURE — 99999 PR OFFICE/OUTPT VISIT,PROCEDURE ONLY: CPT | Performed by: INTERNAL MEDICINE

## 2021-10-06 NOTE — PROGRESS NOTES
Patient here in office and educated on EPS/SVT ablation, schedule for 10/7/21 @ 1:30, with arrival @ 11:30, @ [facility]; risk explained; and consents signed. Also copy of orders given for labs and CXR due STAT on arrival. Instruction given to patient to :  NPO after midnight the night before procedure; call hospital at [phone] to pre-register. May take rest of morning meds of procedure except Propanolol. Hold Propranolol 2 days prior to procedure. Patient voiced understanding. Copies of consent & info scanned in chart. Leonie Landaverde noted after patient left that he would like her to Corlanor for procedure also. Called patient to notify to not take Corlanor the day or procedure. Patient voiced understanding.

## 2021-10-07 ENCOUNTER — APPOINTMENT (OUTPATIENT)
Dept: GENERAL RADIOLOGY | Age: 21
End: 2021-10-07
Attending: INTERNAL MEDICINE
Payer: COMMERCIAL

## 2021-10-07 ENCOUNTER — HOSPITAL ENCOUNTER (OUTPATIENT)
Dept: CARDIAC CATH/INVASIVE PROCEDURES | Age: 21
Discharge: HOME | End: 2021-10-07
Attending: INTERNAL MEDICINE | Admitting: INTERNAL MEDICINE
Payer: COMMERCIAL

## 2021-10-07 VITALS
HEART RATE: 101 BPM | HEIGHT: 61 IN | RESPIRATION RATE: 20 BRPM | SYSTOLIC BLOOD PRESSURE: 114 MMHG | DIASTOLIC BLOOD PRESSURE: 88 MMHG | WEIGHT: 159 LBS | BODY MASS INDEX: 30.02 KG/M2 | TEMPERATURE: 96.8 F

## 2021-10-07 LAB
PREGNANCY TEST URINE, POC: NEGATIVE
SARS-COV-2, NAAT: NOT DETECTED
SOURCE: NORMAL

## 2021-10-07 PROCEDURE — 87635 SARS-COV-2 COVID-19 AMP PRB: CPT

## 2021-10-07 PROCEDURE — 81025 URINE PREGNANCY TEST: CPT

## 2021-10-07 PROCEDURE — 86900 BLOOD TYPING SEROLOGIC ABO: CPT

## 2022-05-18 ENCOUNTER — TELEMEDICINE (OUTPATIENT)
Dept: FAMILY MEDICINE CLINIC | Age: 22
End: 2022-05-18
Payer: COMMERCIAL

## 2022-05-18 DIAGNOSIS — Z00.00 ROUTINE GENERAL MEDICAL EXAMINATION AT A HEALTH CARE FACILITY: Primary | ICD-10-CM

## 2022-05-18 DIAGNOSIS — G43.009 MIGRAINE WITHOUT AURA AND WITHOUT STATUS MIGRAINOSUS, NOT INTRACTABLE: ICD-10-CM

## 2022-05-18 PROCEDURE — 99214 OFFICE O/P EST MOD 30 MIN: CPT

## 2022-05-18 RX ORDER — SUMATRIPTAN 100 MG/1
100 TABLET, FILM COATED ORAL
COMMUNITY
End: 2022-05-18 | Stop reason: ALTCHOICE

## 2022-05-18 RX ORDER — UBROGEPANT 50 MG/1
50 TABLET ORAL PRN
Qty: 16 TABLET | Refills: 2 | Status: SHIPPED | OUTPATIENT
Start: 2022-05-18 | End: 2022-08-29 | Stop reason: SDUPTHER

## 2022-05-18 RX ORDER — ATOGEPANT 10 MG/1
10 TABLET ORAL DAILY
Qty: 30 TABLET | Refills: 2 | Status: SHIPPED | OUTPATIENT
Start: 2022-05-18 | End: 2022-08-16

## 2022-05-18 ASSESSMENT — ENCOUNTER SYMPTOMS
CONSTIPATION: 0
SHORTNESS OF BREATH: 0

## 2022-05-18 NOTE — PATIENT INSTRUCTIONS
Patient Education   Patient Education        ubrogepant  Pronunciation: genoveva mcknight  Brand: Carla Patel  What is the most important information I should know about ubrogepant? Tell your doctor about all your current medicines and any you start or stop using. Many drugs can interact, and some drugs should not be used together. What is ubrogepant? Tonya  is used in adults to treat migraine headaches with or without aura. Tonya  will not prevent a migraine headache. Tonya  may also be used for purposes not listed in this medication guide. What should I discuss with my healthcare provider before taking ubrogepant? Many drugs can interact and cause dangerous effects. Some drugs should not be used together with ubrogepant. Your doctor may change your treatment plan if you also use:   nefazodone;   an antibiotic --clarithromycin, telithromycin;   antifungal medicine --itraconazole, ketoconazole; or   antiviral medicine to treat HIV/AIDS --indinavir, nelfinavir, ritonavir, saquinavir. Tell your doctor if you have ever had:   liver disease; or   kidney disease. Tell your doctor if you are pregnant or plan to become pregnant. It is not known whether ubrogepant will harm an unborn baby. However, having migraine headaches during pregnancy may cause complications such as diabetes or eclampsia (dangerously high blood pressure that can lead to medical problems in both mother and baby). The benefit of treating migraines may outweigh any risksto the baby. It may not be safe to breastfeed while using this medicine. Ask your doctorabout any risk. Tonya  is not approved for use by anyone younger than 25years old. How should I take ubrogepant? Follow all directions on your prescription label and read all medication guidesor instruction sheets. Use the medicine exactly as directed. You may take ubrogepant with or without food. Avoid grapefruit or grapefruitjuice.   After taking ubrogepant:  If your headache does not completely go away, or goes away and comes back, you may take a second tablet if it has been at least 2 hours since your first dose. If your symptoms have not improved, contact your doctor before taking any moretablets. You should not take a second tablet within 24 hours if you have consumed a grapefruit product, or if you also take any of the following medications:    ciprofloxacin;   cyclosporine;   fluconazole;   fluvoxamine; or   verapamil. Call your doctor if you have more than 8 headaches in one month (30 days). Tell your doctor if this medicine seems to stop working as well in treating your migraine attacks. Store at room temperature away from moisture and heat. What happens if I miss a dose? Since ubrogepant is used when needed, it does not have a daily dosing schedule. Do not take more than 200 milligrams in a 24-hour period. Do not use ubrogepant to treat more than 8 headaches per month. What happens if I overdose? Seek emergency medical attention or call the Poison Help line at 1-622.988.2328. What should I avoid while taking ubrogepant? Grapefruit may interact with ubrogepant and lead to unwanted side effects. You should not take a second ubrogepant tablet within 24 hours after consuming grapefruit or grapefruit juice. What are the possible side effects of ubrogepant? Get emergency medical help if you have signs of an allergic reaction: hives; difficult breathing; swelling of your face, lips, tongue, or throat. Common side effects may include:   nausea; or   drowsiness. This is not a complete list of side effects and others may occur. Call your doctor for medical advice about side effects. You may report side effects toFDA at 2-343-BSP-5288. What other drugs will affect ubrogepant? Sometimes it is not safe to use certain medications at the same time.  Some drugs can affect your blood levels of other drugs you take, which mayincrease side effects or make the safe, effective or appropriate for any given patient. [facility] does not assume any responsibility for any aspect of healthcare administered with the aid of information [facility] provides. The information contained herein is not intended to cover all possible uses, directions, precautions, warnings, drug interactions, allergic reactions, or adverse effects. If you have questions about the drugs you are taking, check with yourdoctor, nurse or pharmacist.  Copyright 2265-7772 [address]. Version: 1.01. Revision date: 2/18/2020. Care instructions adapted under license by [facility] ([facility]). If you have questions about a medical condition or this instruction, always ask your healthcare professional. [place] [address] any warranty or liability for your use of this information. atogepant  Pronunciation: a TOE je pant  Brand: Alexis Forman  What is the most important information I should know about atogepant? Use only as directed. Tell your doctor if you use other medicines or have othermedical conditions or allergies. What is atogepant? Atogepant is used to prevent migraine headache episodes in adults. Atogepant may also be used for purposes not listed in this medication guide. What should I discuss with my healthcare provider before taking atogepant? Tell your doctor if you have ever had:   kidney disease (or if you are on dialysis); or   liver disease. In animal studies, atogepant caused pregnancy problems such as low birth weight or birth defects. It is not known if these effects could occur in humans. [address] [place] [address] Dr Molly tellez about the risk. Having migraine headaches during pregnancy may increase the risk of dangerously high blood pressure that can lead to medical problems in both mother and baby. The benefit of preventing migraines may outweigh any risk. Tell your doctor if you are pregnant or plan to become pregnant. Ask a doctor if it is safe to breastfeed while using this medicine.   Not approved for use by anyone younger than 25years old. How should I take atogepant? Follow all directions on your prescription label and read all medication guidesor instruction sheets. Use the medicine exactly as directed. You may take atogepant with or without food. Store at room temperature away from moisture and heat. What happens if I miss a dose? Take the medicine as soon as you can, but skip the missed dose if it is almost time for your next dose. Do not take two doses at one time. What happens if I overdose? Seek emergency medical attention or call the Poison Help line at 1-919.830.3300. What should I avoid while taking atogepant? Follow your doctor's instructions about any restrictions on food, beverages, oractivity. What are the possible side effects of atogepant? Get emergency medical help if you have signs of an allergic reaction: hives; difficult breathing; swelling of your face, lips, tongue, or throat. Common side effects may include:   nausea, constipation;   feeling tired; or   weight loss. This is not a complete list of side effects and others may occur. Call your doctor for medical advice about side effects. You may report side effects toFDA at 2-359-URV-7158. What other drugs will affect atogepant? Tell your doctor about all your current medicines. Many drugs can affect atogepant, especially:   cyclosporine;   Mena's wort;   an antibiotic such as clarithromycin or rifampin;   antifungal medicine such as itraconazole or ketoconazole;   antiviral medicine to treat HIV, such as efavirenz or etravirine; or   seizure medicine such as carbamazepine or phenytoin. This list is not complete and many other drugs may affect atogepant. This includes prescription and over-the-counter medicines, vitamins, andherbal products. Not all possible drug interactions are listed here. Where can I get more information?   Your pharmacist can provide more information about atogepant. Remember, keep this and all other medicines out of the reach of children, never share your medicines with others, and use this medication only for the indication prescribed. Every effort has been made to ensure that the information provided by [address] is accurate, up-to-date, and complete, but no guarantee is made to that effect. Drug information contained herein may be time sensitive. [facility] information has been compiled for use by healthcare practitioners and consumers in the [place] and therefore [facility] does not warrant that uses outside of the [place] are appropriate, unless specifically indicated otherwise. [facility]'s drug information does not endorse drugs, diagnose patients or recommend therapy. [facility]'s drug information is an informational resource designed to assist licensed healthcare practitioners in caring for their patients and/or to serve consumers viewing this service as a supplement to, and not a substitute for, the expertise, skill, knowledge and judgment of healthcare practitioners. The absence of a warning for a given drug or drug combination in no way should be construed to indicate that the drug or drug combination is safe, effective or appropriate for any given patient. [facility] does not assume any responsibility for any aspect of healthcare administered with the aid of information [facility] provides. The information contained herein is not intended to cover all possible uses, directions, precautions, warnings, drug interactions, allergic reactions, or adverse effects. If you have questions about the drugs you are taking, check with yourdoctor, nurse or pharmacist.  Copyright 2426-1101 [address]. Version: 1.01. Revision date:10/19/2021. Care instructions adapted under license by [facility] ([facility]).  If you have questions about a medical condition or this instruction, always ask your healthcare professional. [address] any warranty or liability for your use of this information.

## 2022-05-18 NOTE — PROGRESS NOTES
Benji Dallas (:  2000) is a Established patient, here for evaluation of the following:    Assessment & Plan   Below is the assessment and plan developed based on review of pertinent history, physical exam, labs, studies, and medications. 1. Routine general medical examination at a health care facility  -     CBC with Auto Differential; Future  -     Comprehensive Metabolic Panel; Future  -     Lipid Panel; Future  -     TSH; Future  2. Migraine without aura and without status migrainosus, not intractable  -     Atogepant (QULIPTA) 10 MG TABS; Take 10 mg by mouth daily, Disp-30 tablet, R-2Normal  -     Ubrogepant (UBRELVY) 50 MG TABS; Take 50 mg by mouth as needed (Migraines), Disp-16 tablet, R-2Normal  - Patient educated on new medications  - Educational information on both medications attached to AVS summary  - If these medications are ineffective or insurance will not cover consider referral to neurology for additional treatment options and evaluation    Return in about 3 months (around 2022). Subjective   HPI  Review of Systems   Constitutional: Negative for appetite change, chills and fever. Eyes: Negative for visual disturbance. Respiratory: Negative for shortness of breath. Cardiovascular: Negative for chest pain and palpitations. Gastrointestinal: Negative for constipation. Genitourinary: Negative for hematuria. Neurological: Positive for headaches (Chronic migraine). Negative for dizziness, seizures, speech difficulty and weakness. Migraines: Patient reports a history of migraines since she was 15years old. She has been on and off topamax several times in the past with in effective control of symptoms. Patient reports worsening of migraines in the past month, increasing in frequency to 4x weekly. She has used OTC Excedrin and aleve, as well as sumatriptan. All of these measures provided and effective relief for a three day long headache during this time. Patient states that her headaches usually start behind one of her eyes L>R and then it moves back to the base of her skull until it goes away. Patient endorses nausea and vomiting as well as photophobia during these episodes. Migraines can last between 2 hours and 2 days for most episodes. Patient reports that she has had difficulty identifying triggers, she can go for up to 6 months with no headache activity and then will have several back-to-back. Never been seen by neurology. Patient denies loss of vision. Patient endorses some blurry vision, fatigue, dull headache post migraine. 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 CT and brain MRI from that time is the only imaging on file. No acute intracranial abnormalities noted on either scan. Tachycardia:   2020 and she was placed on corlanor and metoprolol (made her feel bad) switched to propanolol. Patient reports that there was significant fatigue with beta-blockers metoprolol and propranolol and both were discontinued. Objective   Patient-Reported Vitals  Patient-Reported Weight: 149lb  Patient-Reported Height: 5'1\"       Physical Exam  Constitutional:       Appearance: Normal appearance. She is normal weight. HENT:      Head: Normocephalic and atraumatic. Pulmonary:      Effort: Pulmonary effort is normal.   Musculoskeletal:      Cervical back: Normal range of motion. Skin:     General: Skin is dry. Neurological:      Mental Status: She is alert and oriented to person, place, and time. Mental status is at baseline. Psychiatric:         Mood and Affect: Mood normal.         Behavior: Behavior normal.         Thought Content:  Thought content normal.         Judgment: Judgment normal.       [INSTRUCTIONS:  \"[x]\" Indicates a positive item  \"[]\" Indicates a negative item  -- DELETE ALL ITEMS NOT EXAMINED]    Constitutional: [x] Appears well-developed and well-nourished [x] No apparent distress      [] Abnormal -     Mental status: [x] Alert and awake  [x] Oriented to person/place/time [x] Able to follow commands    [] Abnormal -     Eyes:   EOM    [x]  Normal    [] Abnormal -   Sclera  [x]  Normal    [] Abnormal -          Discharge [x]  None visible   [] Abnormal -     HENT: [x] Normocephalic, atraumatic  [] Abnormal -   [x] Mouth/Throat: Mucous membranes are moist    External Ears [x] Normal  [] Abnormal -    Neck: [x] No visualized mass [] Abnormal -     Pulmonary/Chest: [x] Respiratory effort normal   [x] No visualized signs of difficulty breathing or respiratory distress        [] Abnormal -      Musculoskeletal:   [x] Normal gait with no signs of ataxia         [x] Normal range of motion of neck        [] Abnormal -     Neurological:        [x] No Facial Asymmetry (Cranial nerve 7 motor function) (limited exam due to video visit)          [x] No gaze palsy        [] Abnormal -          Skin:        [x] No significant exanthematous lesions or discoloration noted on facial skin         [] Abnormal -            Psychiatric:       [x] Normal Affect [] Abnormal -        [x] No Hallucinations    Other pertinent observable physical exam findings:-             On this date 5/18/2022 I have spent 38 minutes reviewing previous notes, test results and face to face (virtual) with the patient discussing the diagnosis and importance of compliance with the treatment plan as well as documenting on the day of the visitKsenia Mercado, was evaluated through a synchronous (real-time) audio-video encounter. The patient (or guardian if applicable) is aware that this is a billable service, which includes applicable co-pays. This Virtual Visit was conducted with patient's (and/or legal guardian's) consent.  The visit was conducted pursuant to the emergency declaration under the [facility], 1135 waiver authority and the [employer] Act. Patient identification was verified, and a caregiver was present when appropriate. The patient was located at home in a state where the provider was licensed to provide care.        --Sara Denver, APRN - CNP

## 2022-06-17 PROBLEM — Z00.00 ROUTINE GENERAL MEDICAL EXAMINATION AT A HEALTH CARE FACILITY: Status: RESOLVED | Noted: 2022-05-18 | Resolved: 2022-06-17

## 2022-08-29 ENCOUNTER — OFFICE VISIT (OUTPATIENT)
Dept: FAMILY MEDICINE CLINIC | Age: 22
End: 2022-08-29
Payer: COMMERCIAL

## 2022-08-29 VITALS
DIASTOLIC BLOOD PRESSURE: 76 MMHG | HEIGHT: 61 IN | HEART RATE: 88 BPM | SYSTOLIC BLOOD PRESSURE: 110 MMHG | BODY MASS INDEX: 28.51 KG/M2 | WEIGHT: 151 LBS | OXYGEN SATURATION: 99 %

## 2022-08-29 DIAGNOSIS — R00.0 TACHYCARDIA: ICD-10-CM

## 2022-08-29 DIAGNOSIS — G43.009 MIGRAINE WITHOUT AURA AND WITHOUT STATUS MIGRAINOSUS, NOT INTRACTABLE: Primary | ICD-10-CM

## 2022-08-29 DIAGNOSIS — D17.9 LIPOMA, UNSPECIFIED SITE: ICD-10-CM

## 2022-08-29 PROCEDURE — 99213 OFFICE O/P EST LOW 20 MIN: CPT | Performed by: FAMILY MEDICINE

## 2022-08-29 RX ORDER — ATOGEPANT 10 MG/1
10 TABLET ORAL DAILY
Qty: 30 TABLET | Refills: 5 | Status: SHIPPED | OUTPATIENT
Start: 2022-08-29

## 2022-08-29 RX ORDER — UBROGEPANT 50 MG/1
50 TABLET ORAL PRN
Qty: 16 TABLET | Refills: 2 | Status: SHIPPED | OUTPATIENT
Start: 2022-08-29

## 2022-08-29 ASSESSMENT — PATIENT HEALTH QUESTIONNAIRE - PHQ9
SUM OF ALL RESPONSES TO PHQ9 QUESTIONS 1 & 2: 0
SUM OF ALL RESPONSES TO PHQ QUESTIONS 1-9: 0
2. FEELING DOWN, DEPRESSED OR HOPELESS: 0
1. LITTLE INTEREST OR PLEASURE IN DOING THINGS: 0

## 2022-08-30 ASSESSMENT — ENCOUNTER SYMPTOMS
SHORTNESS OF BREATH: 0
ABDOMINAL PAIN: 0
EYE PAIN: 0
NAUSEA: 0
CHEST TIGHTNESS: 0
VOMITING: 0
WHEEZING: 0
DIARRHEA: 0
BLOOD IN STOOL: 0
TROUBLE SWALLOWING: 0

## 2022-08-30 NOTE — PROGRESS NOTES
8/30/2022    [place] [id number]    Chief Complaint   Patient presents with    Migraine     4 x week before meds, down to 1-2 per week now. Mass     Lump on back wants looked at. X1 year. No changes in size. HPI  History was obtained from the patient. Vasiliy Chamorro is a 25 y.o. female who presents today with routine follow-up on migraine headaches, history of tachycardia, and has lump on her back would like to have it looked at. She is graduated from [employer] and is moving to [place] [place] for her job. Her fiancé is in law school there also. She states her headaches overall are much improved using the accumulative preventative and Ubrelvy for acute treatment. Most weeks she has only 1 migraine headache and can be treated with acute Rx. Cardiology does have her on antiarrhythmic Corlanor. REVIEW OF SYMPTOMS    Review of Systems   Constitutional:  Negative for activity change and fatigue. HENT:  Negative for congestion, hearing loss, mouth sores and trouble swallowing. Eyes:  Negative for pain and visual disturbance. Respiratory:  Negative for chest tightness, shortness of breath and wheezing. Cardiovascular:  Negative for chest pain and palpitations. History of tachycardia. Gastrointestinal:  Negative for abdominal pain, blood in stool, diarrhea, nausea and vomiting. Endocrine: Negative for polydipsia and polyuria. Genitourinary:  Negative for dysuria, frequency and urgency. Musculoskeletal:  Negative for arthralgias, gait problem and neck stiffness. Skin:  Negative for rash. Lump left upper back. Nontender and unchanging. Allergic/Immunologic: Negative for environmental allergies. Neurological:  Positive for headaches. Negative for dizziness, seizures, speech difficulty and weakness. Hematological:  Does not bruise/bleed easily. Psychiatric/Behavioral:  Negative for agitation, confusion and hallucinations.       PAST MEDICAL HISTORY  Past Medical History: Diagnosis Date    Headache        FAMILY HISTORY  Family History   Problem Relation Age of Onset    Cancer Other         Grandmother/Grandfather    Asthma Paternal Grandfather     Migraines Mother     Kidney Disease Other         Grandfather    Hypertension Other     Asthma Father        SOCIAL HISTORY  Social History     Socioeconomic History    Marital status: Single     Spouse name: None    Number of children: None    Years of education: None    Highest education level: None   Tobacco Use    Smoking status: Never    Smokeless tobacco: Never   Substance and Sexual Activity    Alcohol use: No    Drug use: No    Sexual activity: Never        SURGICAL HISTORY  Past Surgical History:   Procedure Laterality Date    TONSILLECTOMY                   CURRENT MEDICATIONS  Current Outpatient Medications   Medication Sig Dispense Refill    Atogepant (QULIPTA) 10 MG TABS Take 10 mg by mouth daily 30 tablet 5    Ubrogepant (UBRELVY) 50 MG TABS Take 50 mg by mouth as needed (Migraines) 16 tablet 2    CORLANOR 5 MG TABS tablet 5 mg 2 times daily (with meals)        No current facility-administered medications for this visit. ALLERGIES  No Known Allergies    PHYSICAL EXAM    /76 (Site: Right Upper Arm, Position: Sitting, Cuff Size: Medium Adult)   Pulse 88   Ht 5' 1\" (1.549 m)   Wt 151 lb (68.5 kg)   SpO2 99%   BMI 28.53 kg/m²     Physical Exam  Vitals and nursing note reviewed. Constitutional:       General: She is not in acute distress. Appearance: Normal appearance. She is well-developed. She is not ill-appearing or toxic-appearing. HENT:      Head: Normocephalic and atraumatic. Nose: Nose normal.      Mouth/Throat:      Mouth: Mucous membranes are moist.      Pharynx: Oropharynx is clear. Eyes:      Pupils: Pupils are equal, round, and reactive to light. Cardiovascular:      Rate and Rhythm: Normal rate and regular rhythm. Heart sounds: Normal heart sounds. No murmur heard.     No gallop. Pulmonary:      Effort: Pulmonary effort is normal. No respiratory distress. Breath sounds: Normal breath sounds. No wheezing, rhonchi or rales. Abdominal:      Palpations: Abdomen is soft. Musculoskeletal:         General: No swelling. Normal range of motion. Cervical back: Normal range of motion and neck supple. No rigidity. Lymphadenopathy:      Cervical: No cervical adenopathy. Skin:     General: Skin is warm and dry. Coloration: Skin is not jaundiced. Findings: No bruising. Comments: Patient has a subtle several centimeter right scapular area mass nontender consistent with a lipoma. Neurological:      General: No focal deficit present. Mental Status: She is alert and oriented to person, place, and time. Mental status is at baseline. Cranial Nerves: No cranial nerve deficit. Motor: No weakness. Psychiatric:         Mood and Affect: Mood normal.         Behavior: Behavior normal.         Thought Content: Thought content normal.       ASSESSMENT & PLAN     Diagnosis Orders   1. Migraine without aura and without status migrainosus, not intractable  Ubrogepant (UBRELVY) 50 MG TABS      2. Tachycardia        3. Lipoma, unspecified site        Patient advised to continue on same regimen and refills to be provided. Continue to work on healthy lifestyle with regular exercise and maintenance of ideal body weight. Reassurance about lipoma no need for treatment unless it changes or causes discomfort. Keep patient on same migraine Rx. Get new COVID shot when available for her age group. Call with issues or changes. Return in about 6 months (around 2/28/2023).          Electronically signed by Boubacar Watson MD on 8/30/2022

## 2023-07-20 ENCOUNTER — TELEPHONE (OUTPATIENT)
Dept: FAMILY MEDICINE CLINIC | Age: 23
End: 2023-07-20

## 2023-07-20 RX ORDER — ATOGEPANT 10 MG/1
10 TABLET ORAL DAILY
Qty: 90 TABLET | OUTPATIENT
Start: 2023-07-20

## 2023-07-20 NOTE — TELEPHONE ENCOUNTER
Left message on patient identified voice mail. Patient needs appointment. Pharmacy requesting refill. Has not been seen since August of 2022.

## 2023-08-11 ENCOUNTER — OFFICE VISIT (OUTPATIENT)
Dept: FAMILY MEDICINE CLINIC | Age: 23
End: 2023-08-11
Payer: COMMERCIAL

## 2023-08-11 VITALS
OXYGEN SATURATION: 98 % | SYSTOLIC BLOOD PRESSURE: 108 MMHG | WEIGHT: 161 LBS | DIASTOLIC BLOOD PRESSURE: 70 MMHG | RESPIRATION RATE: 16 BRPM | HEIGHT: 61 IN | HEART RATE: 93 BPM | BODY MASS INDEX: 30.4 KG/M2

## 2023-08-11 DIAGNOSIS — G43.009 MIGRAINE WITHOUT AURA AND WITHOUT STATUS MIGRAINOSUS, NOT INTRACTABLE: ICD-10-CM

## 2023-08-11 PROCEDURE — 99213 OFFICE O/P EST LOW 20 MIN: CPT | Performed by: PHYSICIAN ASSISTANT

## 2023-08-11 RX ORDER — UBROGEPANT 50 MG/1
50 TABLET ORAL PRN
Qty: 16 TABLET | Refills: 2 | Status: SHIPPED | OUTPATIENT
Start: 2023-08-11

## 2023-08-11 RX ORDER — ATOGEPANT 10 MG/1
10 TABLET ORAL DAILY
Qty: 90 TABLET | Refills: 1 | Status: SHIPPED | OUTPATIENT
Start: 2023-08-11

## 2023-08-11 SDOH — ECONOMIC STABILITY: HOUSING INSECURITY
IN THE LAST 12 MONTHS, WAS THERE A TIME WHEN YOU DID NOT HAVE A STEADY PLACE TO SLEEP OR SLEPT IN A SHELTER (INCLUDING NOW)?: NO

## 2023-08-11 SDOH — ECONOMIC STABILITY: FOOD INSECURITY: WITHIN THE PAST 12 MONTHS, YOU WORRIED THAT YOUR FOOD WOULD RUN OUT BEFORE YOU GOT MONEY TO BUY MORE.: NEVER TRUE

## 2023-08-11 SDOH — ECONOMIC STABILITY: FOOD INSECURITY: WITHIN THE PAST 12 MONTHS, THE FOOD YOU BOUGHT JUST DIDN'T LAST AND YOU DIDN'T HAVE MONEY TO GET MORE.: NEVER TRUE

## 2023-08-11 SDOH — ECONOMIC STABILITY: INCOME INSECURITY: HOW HARD IS IT FOR YOU TO PAY FOR THE VERY BASICS LIKE FOOD, HOUSING, MEDICAL CARE, AND HEATING?: NOT VERY HARD

## 2023-08-11 SDOH — ECONOMIC STABILITY: TRANSPORTATION INSECURITY
IN THE PAST 12 MONTHS, HAS LACK OF TRANSPORTATION KEPT YOU FROM MEETINGS, WORK, OR FROM GETTING THINGS NEEDED FOR DAILY LIVING?: NO

## 2023-08-11 ASSESSMENT — PATIENT HEALTH QUESTIONNAIRE - PHQ9
1. LITTLE INTEREST OR PLEASURE IN DOING THINGS: NOT AT ALL
1. LITTLE INTEREST OR PLEASURE IN DOING THINGS: 0
2. FEELING DOWN, DEPRESSED OR HOPELESS: 0
2. FEELING DOWN, DEPRESSED OR HOPELESS: NOT AT ALL
SUM OF ALL RESPONSES TO PHQ QUESTIONS 1-9: 0
SUM OF ALL RESPONSES TO PHQ9 QUESTIONS 1 & 2: 0
SUM OF ALL RESPONSES TO PHQ QUESTIONS 1-9: 0
SUM OF ALL RESPONSES TO PHQ9 QUESTIONS 1 & 2: 0
SUM OF ALL RESPONSES TO PHQ QUESTIONS 1-9: 0
SUM OF ALL RESPONSES TO PHQ QUESTIONS 1-9: 0

## 2024-05-14 DIAGNOSIS — G43.009 MIGRAINE WITHOUT AURA AND WITHOUT STATUS MIGRAINOSUS, NOT INTRACTABLE: ICD-10-CM

## 2024-05-14 RX ORDER — ATOGEPANT 10 MG/1
1 TABLET ORAL DAILY
Qty: 90 TABLET | Refills: 1 | Status: SHIPPED | OUTPATIENT
Start: 2024-05-14

## 2024-11-05 DIAGNOSIS — G43.009 MIGRAINE WITHOUT AURA AND WITHOUT STATUS MIGRAINOSUS, NOT INTRACTABLE: ICD-10-CM

## 2024-11-05 RX ORDER — ATOGEPANT 10 MG/1
1 TABLET ORAL DAILY
Qty: 90 TABLET | Refills: 1 | OUTPATIENT
Start: 2024-11-05

## 2024-11-05 RX ORDER — UBROGEPANT 50 MG/1
50 TABLET ORAL PRN
Qty: 16 TABLET | Refills: 2 | OUTPATIENT
Start: 2024-11-05

## 2024-11-27 ENCOUNTER — OFFICE VISIT (OUTPATIENT)
Dept: FAMILY MEDICINE CLINIC | Age: 24
End: 2024-11-27
Payer: COMMERCIAL

## 2024-11-27 VITALS
WEIGHT: 170 LBS | HEIGHT: 61 IN | SYSTOLIC BLOOD PRESSURE: 114 MMHG | HEART RATE: 85 BPM | BODY MASS INDEX: 32.1 KG/M2 | DIASTOLIC BLOOD PRESSURE: 84 MMHG | OXYGEN SATURATION: 98 %

## 2024-11-27 DIAGNOSIS — G43.009 MIGRAINE WITHOUT AURA AND WITHOUT STATUS MIGRAINOSUS, NOT INTRACTABLE: ICD-10-CM

## 2024-11-27 DIAGNOSIS — Z00.00 ENCOUNTER FOR WELL ADULT EXAM WITHOUT ABNORMAL FINDINGS: Primary | ICD-10-CM

## 2024-11-27 PROCEDURE — 99395 PREV VISIT EST AGE 18-39: CPT | Performed by: INTERNAL MEDICINE

## 2024-11-27 RX ORDER — ATOGEPANT 10 MG/1
1 TABLET ORAL DAILY
Qty: 90 TABLET | Refills: 1 | Status: SHIPPED | OUTPATIENT
Start: 2024-11-27

## 2024-11-27 RX ORDER — UBROGEPANT 100 MG/1
100 TABLET ORAL DAILY PRN
Qty: 16 TABLET | Refills: 5 | Status: SHIPPED | OUTPATIENT
Start: 2024-11-27 | End: 2025-05-26

## 2024-11-27 RX ORDER — UBROGEPANT 50 MG/1
50 TABLET ORAL PRN
Qty: 16 TABLET | Refills: 2 | Status: CANCELLED | OUTPATIENT
Start: 2024-11-27

## 2024-11-27 NOTE — PROGRESS NOTES
Outpatient Clinic Visit Note    Patient: Telly Riley  : 2000 (24 y.o.)  Date: 2024    CC:  Chief Complaint   Patient presents with    Annual Exam    Medication Refill        HPI: she has had a migraine nearly every day since running out of qulipta.    Past Medical History:    Past Medical History:   Diagnosis Date    Headache        Past Surgical History:  Past Surgical History:   Procedure Laterality Date    TONSILLECTOMY         Home Medications:  Current Outpatient Medications   Medication Sig Dispense Refill    Atogepant (QULIPTA) 10 MG TABS Take 1 tablet by mouth daily 90 tablet 1    Ubrogepant (UBRELVY) 100 MG TABS Take 100 mg by mouth daily as needed (migraine) 16 tablet 5    CORLANOR 5 MG TABS tablet 1 tablet 2 times daily (with meals)       No current facility-administered medications for this visit.        Allergies:    Patient has no known allergies.    Family History:       Problem Relation Age of Onset    Cancer Other         Grandmother/Grandfather    Asthma Paternal Grandfather     Migraines Mother     Kidney Disease Other         Grandfather    Hypertension Other     Asthma Father         ROS: A 10-organ Review Of Systems was obtained and otherwise unremarkable except as per HPI.    Data: Old records have been reviewed electronically.    PHYSICAL EXAM:  /84 (Site: Left Upper Arm, Position: Sitting, Cuff Size: Medium Adult)   Pulse 85   Ht 1.549 m (5' 1\")   Wt 77.1 kg (170 lb)   SpO2 98%   BMI 32.12 kg/m²     Constitutional: She  is oriented to person, place, and time. She appears well-developed and well-nourished.   HENT:   Head: Normocephalic and atraumatic.   Eyes: Pupils are equal, round, and reactive to light. Conjunctivae and EOM are normal.   Neck: Normal range of motion. Neck supple. No JVD present.   Cardiovascular: Normal rate, regular rhythm, normal heart sounds and intact distal pulses.   Pulmonary/Chest: Effort normal and breath sounds normal.   Abdominal:

## 2025-06-27 DIAGNOSIS — G43.009 MIGRAINE WITHOUT AURA AND WITHOUT STATUS MIGRAINOSUS, NOT INTRACTABLE: ICD-10-CM

## 2025-06-30 RX ORDER — ATOGEPANT 10 MG/1
1 TABLET ORAL DAILY
Qty: 90 TABLET | Refills: 1 | OUTPATIENT
Start: 2025-06-30

## 2025-07-08 DIAGNOSIS — G43.009 MIGRAINE WITHOUT AURA AND WITHOUT STATUS MIGRAINOSUS, NOT INTRACTABLE: ICD-10-CM

## 2025-07-09 RX ORDER — ATOGEPANT 10 MG/1
1 TABLET ORAL DAILY
Qty: 90 TABLET | Refills: 1 | Status: SHIPPED | OUTPATIENT
Start: 2025-07-09